# Patient Record
Sex: FEMALE | Race: WHITE | URBAN - METROPOLITAN AREA
[De-identification: names, ages, dates, MRNs, and addresses within clinical notes are randomized per-mention and may not be internally consistent; named-entity substitution may affect disease eponyms.]

---

## 2020-06-08 ENCOUNTER — OFFICE VISIT (OUTPATIENT)
Dept: URBAN - METROPOLITAN AREA CLINIC 45 | Facility: CLINIC | Age: 80
End: 2020-06-08
Payer: MEDICARE

## 2020-06-08 DIAGNOSIS — H35.3131 NONEXUDATIVE AGE-RELATED MACULAR DEGENERATION, BILATERAL, EARLY DRY STAGE: ICD-10-CM

## 2020-06-08 DIAGNOSIS — H20.012 PRIMARY IRIDOCYCLITIS, LEFT EYE: Primary | ICD-10-CM

## 2020-06-08 DIAGNOSIS — Z79.899 OTHER LONG TERM (CURRENT) DRUG THERAPY: ICD-10-CM

## 2020-06-08 DIAGNOSIS — L93.0 LUPUS ERYTHEMATOSUS: ICD-10-CM

## 2020-06-08 PROCEDURE — 92004 COMPRE OPH EXAM NEW PT 1/>: CPT | Performed by: OPTOMETRIST

## 2020-06-08 PROCEDURE — 92134 CPTRZ OPH DX IMG PST SGM RTA: CPT | Performed by: OPTOMETRIST

## 2020-06-08 RX ORDER — PREDNISOLONE ACETATE 10 MG/ML
1 % SUSPENSION/ DROPS OPHTHALMIC
Qty: 1 | Refills: 0 | Status: INACTIVE
Start: 2020-06-08 | End: 2020-07-07

## 2020-06-08 ASSESSMENT — KERATOMETRY
OD: 44.25
OS: 43.38

## 2020-06-08 ASSESSMENT — INTRAOCULAR PRESSURE
OD: 14
OS: 14

## 2020-06-08 ASSESSMENT — VISUAL ACUITY: OD: 20/30

## 2020-06-08 NOTE — IMPRESSION/PLAN
Impression: Other long term (current) drug therapy: Z79.899. Plan: patient has macula changes, advised pt to discontinue Plaquenil therapy. Consider alternative treatment.

## 2020-06-08 NOTE — IMPRESSION/PLAN
Impression: Lupus erythematosus: L93.0. Plan: ocular complications present- iridocyclitis.  Start Pred acetate qid os

## 2020-06-08 NOTE — IMPRESSION/PLAN
Impression: Nonexudative age-related macular degeneration, bilateral, early dry stage: H35.3131.  Plan: Jacob Jj

## 2020-07-13 ENCOUNTER — OFFICE VISIT (OUTPATIENT)
Dept: URBAN - METROPOLITAN AREA CLINIC 45 | Facility: CLINIC | Age: 80
End: 2020-07-13
Payer: MEDICARE

## 2020-07-13 PROCEDURE — 99213 OFFICE O/P EST LOW 20 MIN: CPT | Performed by: OPTOMETRIST

## 2020-07-13 PROCEDURE — 92310 CONTACT LENS FITTING OU: CPT | Performed by: OPTOMETRIST

## 2020-07-13 ASSESSMENT — INTRAOCULAR PRESSURE
OS: 15
OD: 15

## 2020-07-13 ASSESSMENT — VISUAL ACUITY
OD: 20/40
OS: 20/30

## 2020-07-13 NOTE — IMPRESSION/PLAN
Impression: Primary iridocyclitis, left eye: H20.012.  Plan: taper off pred acetate tid x 3 dys then bid x 3 dys then q dy x 3 dys then dc

## 2020-07-13 NOTE — IMPRESSION/PLAN
Impression: Presbyopia: H52.4. Plan: New glasses Rx was given today.  order rgp ko ou, fu at dispense

## 2020-07-24 ENCOUNTER — TESTING ONLY (OUTPATIENT)
Dept: URBAN - METROPOLITAN AREA CLINIC 45 | Facility: CLINIC | Age: 80
End: 2020-07-24

## 2020-07-24 PROCEDURE — 92310 CONTACT LENS FITTING OU: CPT | Performed by: OPTOMETRIST

## 2020-08-06 ENCOUNTER — TESTING ONLY (OUTPATIENT)
Dept: URBAN - METROPOLITAN AREA CLINIC 45 | Facility: CLINIC | Age: 80
End: 2020-08-06

## 2020-08-06 DIAGNOSIS — H52.4 PRESBYOPIA: Primary | ICD-10-CM

## 2020-08-06 PROCEDURE — 92310 CONTACT LENS FITTING OU: CPT | Performed by: OPTOMETRIST
